# Patient Record
Sex: FEMALE | Race: WHITE | NOT HISPANIC OR LATINO | Employment: FULL TIME | URBAN - METROPOLITAN AREA
[De-identification: names, ages, dates, MRNs, and addresses within clinical notes are randomized per-mention and may not be internally consistent; named-entity substitution may affect disease eponyms.]

---

## 2023-03-23 LAB
ALANINE AMINOTRANSFERASE (SGPT) (U/L) IN SER/PLAS: 65 U/L (ref 7–45)
ALBUMIN (G/DL) IN SER/PLAS: 4.4 G/DL (ref 3.4–5)
ALKALINE PHOSPHATASE (U/L) IN SER/PLAS: 77 U/L (ref 33–110)
ANION GAP IN SER/PLAS: 11 MMOL/L (ref 10–20)
ASPARTATE AMINOTRANSFERASE (SGOT) (U/L) IN SER/PLAS: 32 U/L (ref 9–39)
BASOPHILS (10*3/UL) IN BLOOD BY AUTOMATED COUNT: 0.03 X10E9/L (ref 0–0.1)
BASOPHILS/100 LEUKOCYTES IN BLOOD BY AUTOMATED COUNT: 0.4 % (ref 0–2)
BILIRUBIN TOTAL (MG/DL) IN SER/PLAS: 0.8 MG/DL (ref 0–1.2)
CALCIUM (MG/DL) IN SER/PLAS: 9 MG/DL (ref 8.6–10.3)
CARBON DIOXIDE, TOTAL (MMOL/L) IN SER/PLAS: 25 MMOL/L (ref 21–32)
CHLORIDE (MMOL/L) IN SER/PLAS: 107 MMOL/L (ref 98–107)
CHOLESTEROL (MG/DL) IN SER/PLAS: 234 MG/DL (ref 0–199)
CHOLESTEROL IN HDL (MG/DL) IN SER/PLAS: 89.3 MG/DL
CHOLESTEROL/HDL RATIO: 2.6
CREATININE (MG/DL) IN SER/PLAS: 0.95 MG/DL (ref 0.5–1.05)
EOSINOPHILS (10*3/UL) IN BLOOD BY AUTOMATED COUNT: 0.14 X10E9/L (ref 0–0.7)
EOSINOPHILS/100 LEUKOCYTES IN BLOOD BY AUTOMATED COUNT: 2.1 % (ref 0–6)
ERYTHROCYTE DISTRIBUTION WIDTH (RATIO) BY AUTOMATED COUNT: 12.3 % (ref 11.5–14.5)
ERYTHROCYTE MEAN CORPUSCULAR HEMOGLOBIN CONCENTRATION (G/DL) BY AUTOMATED: 34.1 G/DL (ref 32–36)
ERYTHROCYTE MEAN CORPUSCULAR VOLUME (FL) BY AUTOMATED COUNT: 93 FL (ref 80–100)
ERYTHROCYTES (10*6/UL) IN BLOOD BY AUTOMATED COUNT: 4.82 X10E12/L (ref 4–5.2)
GFR FEMALE: 70 ML/MIN/1.73M2
GLUCOSE (MG/DL) IN SER/PLAS: 85 MG/DL (ref 74–99)
HEMATOCRIT (%) IN BLOOD BY AUTOMATED COUNT: 44.6 % (ref 36–46)
HEMOGLOBIN (G/DL) IN BLOOD: 15.2 G/DL (ref 12–16)
IMMATURE GRANULOCYTES/100 LEUKOCYTES IN BLOOD BY AUTOMATED COUNT: 0.3 % (ref 0–0.9)
LDL: 125 MG/DL (ref 0–99)
LEUKOCYTES (10*3/UL) IN BLOOD BY AUTOMATED COUNT: 6.8 X10E9/L (ref 4.4–11.3)
LYMPHOCYTES (10*3/UL) IN BLOOD BY AUTOMATED COUNT: 1.57 X10E9/L (ref 1.2–4.8)
LYMPHOCYTES/100 LEUKOCYTES IN BLOOD BY AUTOMATED COUNT: 23.2 % (ref 13–44)
MONOCYTES (10*3/UL) IN BLOOD BY AUTOMATED COUNT: 0.69 X10E9/L (ref 0.1–1)
MONOCYTES/100 LEUKOCYTES IN BLOOD BY AUTOMATED COUNT: 10.2 % (ref 2–10)
NEUTROPHILS (10*3/UL) IN BLOOD BY AUTOMATED COUNT: 4.33 X10E9/L (ref 1.2–7.7)
NEUTROPHILS/100 LEUKOCYTES IN BLOOD BY AUTOMATED COUNT: 63.8 % (ref 40–80)
PLATELETS (10*3/UL) IN BLOOD AUTOMATED COUNT: 213 X10E9/L (ref 150–450)
POTASSIUM (MMOL/L) IN SER/PLAS: 4.1 MMOL/L (ref 3.5–5.3)
PROTEIN TOTAL: 6.8 G/DL (ref 6.4–8.2)
SODIUM (MMOL/L) IN SER/PLAS: 139 MMOL/L (ref 136–145)
THYROTROPIN (MIU/L) IN SER/PLAS BY DETECTION LIMIT <= 0.05 MIU/L: 1.96 MIU/L (ref 0.44–3.98)
TRIGLYCERIDE (MG/DL) IN SER/PLAS: 99 MG/DL (ref 0–149)
UREA NITROGEN (MG/DL) IN SER/PLAS: 20 MG/DL (ref 6–23)
VLDL: 20 MG/DL (ref 0–40)

## 2023-03-24 LAB — CALCIDIOL (25 OH VITAMIN D3) (NG/ML) IN SER/PLAS: 45 NG/ML

## 2023-06-20 ENCOUNTER — LAB (OUTPATIENT)
Dept: LAB | Facility: LAB | Age: 58
End: 2023-06-20
Payer: COMMERCIAL

## 2023-06-20 ENCOUNTER — OFFICE VISIT (OUTPATIENT)
Dept: PRIMARY CARE | Facility: CLINIC | Age: 58
End: 2023-06-20
Payer: COMMERCIAL

## 2023-06-20 VITALS
DIASTOLIC BLOOD PRESSURE: 82 MMHG | BODY MASS INDEX: 26.26 KG/M2 | OXYGEN SATURATION: 97 % | SYSTOLIC BLOOD PRESSURE: 124 MMHG | WEIGHT: 153 LBS | HEART RATE: 85 BPM

## 2023-06-20 DIAGNOSIS — H93.12 TINNITUS OF LEFT EAR: Primary | ICD-10-CM

## 2023-06-20 DIAGNOSIS — E03.8 OTHER SPECIFIED HYPOTHYROIDISM: ICD-10-CM

## 2023-06-20 LAB
THYROTROPIN (MIU/L) IN SER/PLAS BY DETECTION LIMIT <= 0.05 MIU/L: 0.69 MIU/L (ref 0.44–3.98)
THYROXINE (T4) FREE (NG/DL) IN SER/PLAS: 0.99 NG/DL (ref 0.61–1.12)

## 2023-06-20 PROCEDURE — 86800 THYROGLOBULIN ANTIBODY: CPT

## 2023-06-20 PROCEDURE — 36415 COLL VENOUS BLD VENIPUNCTURE: CPT

## 2023-06-20 PROCEDURE — 99214 OFFICE O/P EST MOD 30 MIN: CPT | Performed by: FAMILY MEDICINE

## 2023-06-20 PROCEDURE — 86376 MICROSOMAL ANTIBODY EACH: CPT

## 2023-06-20 PROCEDURE — 84481 FREE ASSAY (FT-3): CPT

## 2023-06-20 PROCEDURE — 1036F TOBACCO NON-USER: CPT | Performed by: FAMILY MEDICINE

## 2023-06-20 PROCEDURE — 84439 ASSAY OF FREE THYROXINE: CPT

## 2023-06-20 PROCEDURE — 84443 ASSAY THYROID STIM HORMONE: CPT

## 2023-06-20 RX ORDER — ESTRADIOL AND NORETHINDRONE ACETATE 1; .5 MG/1; MG/1
1 TABLET ORAL DAILY
COMMUNITY

## 2023-06-20 RX ORDER — FAMOTIDINE 20 MG/1
20 TABLET, FILM COATED ORAL DAILY
COMMUNITY

## 2023-06-20 RX ORDER — MECLIZINE HYDROCHLORIDE 25 MG/1
TABLET ORAL
COMMUNITY
Start: 2023-01-27

## 2023-06-20 RX ORDER — FLUTICASONE PROPIONATE 50 MCG
2 SPRAY, SUSPENSION (ML) NASAL
COMMUNITY
Start: 2017-05-05

## 2023-06-20 RX ORDER — MINERAL OIL
180 ENEMA (ML) RECTAL
COMMUNITY

## 2023-06-20 ASSESSMENT — PATIENT HEALTH QUESTIONNAIRE - PHQ9
SUM OF ALL RESPONSES TO PHQ9 QUESTIONS 1 AND 2: 0
1. LITTLE INTEREST OR PLEASURE IN DOING THINGS: NOT AT ALL
2. FEELING DOWN, DEPRESSED OR HOPELESS: NOT AT ALL

## 2023-06-20 NOTE — PROGRESS NOTES
Subjective   Kayla Vanegas is a 58 y.o. female who presents for Follow-up (Follow up on Thyroid).  6 month follow up     HPI  Kayla is a pleasant 57 yr old female here for CPE and follow up   Allergic to penicillins   is a   BP is high today and weight is stable 147/84    #) Elevated HR   - COVID in Aug 2022  - up to 150+ per her smart watch    #) IBS, constipation   - seeing BOUCHRA with Dr Whitney office   - start in the linzess 290 about 1 month ago    leakage of stool, - did see Dr Tiwari and was not given answers   - no belly pain   - is having gas and bloating,  - sensation on feeling food move through the esophagus   - tried PPI and didn’t seem to make much or a difference.   - no colonoscopy, did have a normal Cologuard     #) dyspnea with occasional heart palpitations- about the same  -= CT of the chest looks stable   - h/o seasonal allergies, GERD and Sarcoidosis on CT of the lungs in the past.   - will get PFTS and trial of albuterol   - with some elevated BP and heartburn  - EKG done last visit   - started a couple of months ago  - winded after taking a shower and changing laundry  - CAD on both sides  - no h/o clots personal or family,   - left leg cramps are chronic, no calf swelling or pain   - no cough  - chest heaviness   - gained 10# in 6 months with COVID  - 2013 EKG was NSR   - no headaches.   - not currently on treatment for GERD or Anxiety.     #) Heart disease runs in family   27 brother had MI   will get CT coronary calcium test and stress     #) stress with anxiety- stable   - poor sleep   - aging mother now in a nursing home   - is receptive to counseling but doesn’t have the time  - pt has a stressful job working for [employer]   - denies need for medications and/or counseling     #) low Vit D   - 31 on 5/16/19  - taking OTC     #) Elevated BUN, recheck in 2 months  - wnl on 11/20/19    #) elevated TSH, recheck in 2 months - still under producing thyroid, will rec starting 50 mcg  "daily     #) menopause- reports doing great on the Vitex , recently stopped it   - reports some dysfunctional sleep  - weight is stable   - mood is slightly better    #) left ear tinnitus - stable - RESOLVED and then came back - better with abx at  3 weeks ago   - \"wooshing sound\"  No ear pain or drainage.   no associated dizziness or palpitations  no vision changes   never had a formal hearing assessment.     #) seasonal and environmental allergies- stable   - on flonase prn  - occasional claritin    #) H/o kidney stones - better   - plans to see nephro tomorrow   - no pain   - no hematuria or dysuria   - no incontinence     #) GERD - stable , PPI didn’t seen to help much   - no on anything for this, and eats spicy foods   - occasional  - associated with spicy foods  - not taking anything for it   - never had an EGD    #) H/o ITP- stable   - 23 yrs ago after sons birth  - treated with steroids and IVIG     #) dx of sarcoidosis years ago  - granuloma found on ct scan  - no unexplained weight loss or shortness of breath    #) Preventive:  Smoker: never   Etoh: social/occasional   BMI: 22.7  Fam h/o: breast CA, T2DM and CAD  Vaccinations: never gets flu shots,   PAP: 2017  Mammogram: 3/2017; q 6 month mammograms for left breast lesion (through [facility])   Colonoscopy: needs, deferring until later   DEXA: n/a  Fasting blood work: 5/16/19  Last eye exam: UTD   Last dental Exam: needs  Willi H/o appy, C section and tubal ligation     ROS was completed and all systems are negative with the exception of what was noted in the the HPI.     Objective     /82   Pulse 85   Wt 69.4 kg (153 lb)   SpO2 97%   BMI 26.26 kg/m²      Physical Exam  GEN: A+O, no acute distress  HEENT: NC/AT, Oropharynx clear, no exudates, TM visualized, Extraoccular muscles intact, no facial droop; no thyromegaly or cervical LAD  RESP: CTAB, no wheezes   CV: RRR, no murmurs  ABD: soft, non-tender, + BS  SKIN: no rashes or bruising, no " "peripheral edema   NEURO: CN II-XII grossly intact, moves all extremities equally, no tremor   PSYCH: normal affect, appropriate mood     Assessment/Plan   Problem List Items Addressed This Visit    None    Lets recheck the thyroid for the difficulty losing weight, fatigue and hair loss,   - will add T3, T4, and antibody testing to the TSH levels.     Continue the Linzess 290 daily and the Metamucil for the irregular stool.   You should also get an upper scope at the same time.   Try to get at least 35 grams of fiber per day.   Avoid carbonation and artificial sweeteners     Please schedule a hearing test for the \"wooshing\" in the ear.     Try encapsulated peppermint might help bloating and gas.    We should consider a home sleep study for the breathing, palpitations and insomnia.     Your blood pressure is looking so much better and is in range at 124/82.     Continue the flonase daily, allegra and muscinex     Follow up with Dr Manriquez for women examination.     Please continue the vitamin D3 4000 IU daily, it was 45 in March 2023    Follow up in 3 months or sooner as needed     Follow up in January 2024 for your yearly physical        Haylee Steiner, DO, MSMed, ABOM  [address]   [address]   [place] [address]  Ph. [phone]  Fx. [phone]  "

## 2023-06-20 NOTE — PATIENT INSTRUCTIONS
"Lets recheck the thyroid for the difficulty losing weight, fatigue and hair loss,   - will add T3, T4, and antibody testing to the TSH levels.     Continue the Linzess 290 daily and the Metamucil for the irregular stool.   You should also get an upper scope at the same time.   Try to get at least 35 grams of fiber per day.   Avoid carbonation and artificial sweeteners     Please schedule a hearing test for the \"wooshing\" in the ear.     Try encapsulated peppermint might help bloating and gas.    We should consider a home sleep study for the breathing, palpitations and insomnia.     Your blood pressure is looking so much better and is in range at 124/82.     Continue the flonase daily, allegra and muscinex     Follow up with Dr Manriquez for women examination.     Please continue the vitamin D3 4000 IU daily, it was 45 in March 2023    Follow up in 3 months or sooner as needed     Follow up in January 2024 for your yearly physical   "

## 2023-06-21 LAB
THYROPEROXIDASE AB (IU/ML) IN SER/PLAS: 448 IU/ML
TRIIODOTHYRONINE (T3) FREE (PG/ML) IN SER/PLAS: 2.9 PG/ML (ref 2.3–4.2)

## 2023-06-23 LAB — THYROGLOBULIN AB (IU/ML) IN SER/PLAS: 2.1 IU/ML (ref 0–4)

## 2023-09-18 ENCOUNTER — OFFICE VISIT (OUTPATIENT)
Dept: PRIMARY CARE | Facility: CLINIC | Age: 58
End: 2023-09-18
Payer: COMMERCIAL

## 2023-09-18 ENCOUNTER — TELEPHONE (OUTPATIENT)
Dept: PRIMARY CARE | Facility: CLINIC | Age: 58
End: 2023-09-18

## 2023-09-18 VITALS
BODY MASS INDEX: 26.11 KG/M2 | HEART RATE: 91 BPM | SYSTOLIC BLOOD PRESSURE: 124 MMHG | WEIGHT: 152 LBS | OXYGEN SATURATION: 96 % | DIASTOLIC BLOOD PRESSURE: 74 MMHG

## 2023-09-18 DIAGNOSIS — K59.00 CONSTIPATION, UNSPECIFIED CONSTIPATION TYPE: ICD-10-CM

## 2023-09-18 DIAGNOSIS — E03.8 OTHER SPECIFIED HYPOTHYROIDISM: ICD-10-CM

## 2023-09-18 DIAGNOSIS — E06.3 HASHIMOTO'S DISEASE: Primary | ICD-10-CM

## 2023-09-18 DIAGNOSIS — E06.3 HASHIMOTO'S DISEASE: ICD-10-CM

## 2023-09-18 PROCEDURE — 99214 OFFICE O/P EST MOD 30 MIN: CPT | Performed by: FAMILY MEDICINE

## 2023-09-18 PROCEDURE — 90686 IIV4 VACC NO PRSV 0.5 ML IM: CPT | Performed by: FAMILY MEDICINE

## 2023-09-18 PROCEDURE — 1036F TOBACCO NON-USER: CPT | Performed by: FAMILY MEDICINE

## 2023-09-18 PROCEDURE — 90471 IMMUNIZATION ADMIN: CPT | Performed by: FAMILY MEDICINE

## 2023-09-18 RX ORDER — THYROID 30 MG/1
30 TABLET ORAL
Qty: 90 TABLET | Refills: 3 | Status: SHIPPED | OUTPATIENT
Start: 2023-09-18 | End: 2024-09-17

## 2023-09-18 RX ORDER — THYROID 30 MG/1
30 TABLET ORAL
Qty: 90 TABLET | Refills: 3 | Status: SHIPPED | OUTPATIENT
Start: 2023-09-18 | End: 2023-09-18 | Stop reason: SDUPTHER

## 2023-09-18 RX ORDER — LIOTHYRONINE SODIUM 5 UG/1
5 TABLET ORAL DAILY
Qty: 90 TABLET | Refills: 3 | Status: SHIPPED | OUTPATIENT
Start: 2023-09-18 | End: 2024-09-17

## 2023-09-18 ASSESSMENT — PATIENT HEALTH QUESTIONNAIRE - PHQ9
2. FEELING DOWN, DEPRESSED OR HOPELESS: NOT AT ALL
SUM OF ALL RESPONSES TO PHQ9 QUESTIONS 1 AND 2: 0
1. LITTLE INTEREST OR PLEASURE IN DOING THINGS: NOT AT ALL

## 2023-09-18 NOTE — PROGRESS NOTES
Subjective   Patient is a 58 y.o. female presents for yearly physical examination.   Allergic to penicillins   is a     #) Elevated HR - still fluctuating   - no associated symptoms  - BP is good now at 124/74   - COVID in Aug 2022  - up to 150+ per her smart watch    #) IBS, constipation   - seeing BOUCHRA with Dr Whitney office   - start in the linzess 290 about 4 months ago   - has been taking stool softener with the linzess    leakage of stool, - did see Dr Tiwari and was not given answers   - no belly pain   - is having gas and bloating,  - sensation on feeling food move through the esophagus   - tried PPI and didn’t seem to make much or a difference.   - no colonoscopy, did have a normal Cologuard     #) dyspnea with occasional heart palpitations- about the same  -= CT of the chest looks stable   - h/o seasonal allergies, GERD and Sarcoidosis on CT of the lungs in the past.   - will get PFTS and trial of albuterol   - with some elevated BP and heartburn  - EKG done last visit   - started a couple of months ago  - winded after taking a shower and changing laundry  - CAD on both sides  - no h/o clots personal or family,   - left leg cramps are chronic, no calf swelling or pain   - no cough  - chest heaviness   - gained 10# in 6 months with COVID  - 2013 EKG was NSR   - no headaches.   - not currently on treatment for GERD or Anxiety.     #) Heart disease runs in family   27 brother had MI   will get CT coronary calcium test and stress     #) stress with anxiety- stable   - poor sleep   - aging mother now in a nursing home   - is receptive to counseling but doesn’t have the time  - pt has a stressful job working for [employer]   - denies need for medications and/or counseling     #) low Vit D   - 31 on 5/16/19  - taking OTC     #) Elevated BUN, recheck in 2 months  - wnl on 11/20/19    #) elevated TSH, recheck in 2 months - still under producing thyroid, will rec starting 50 mcg daily     #) menopause-  "reports doing great on the Vitex , recently stopped it   - reports some dysfunctional sleep  - weight is stable   - mood is slightly better    #) left ear tinnitus - stable - RESOLVED and then came back - better with abx at  3 weeks ago   - \"wooshing sound\"  No ear pain or drainage.   no associated dizziness or palpitations  no vision changes   never had a formal hearing assessment.     #) seasonal and environmental allergies- stable   - on flonase prn  - occasional claritin    #) H/o kidney stones - better   - plans to see nephro tomorrow   - no pain   - no hematuria or dysuria   - no incontinence     #) GERD - stable , PPI didn’t seen to help much   - no on anything for this, and eats spicy foods   - occasional  - associated with spicy foods  - not taking anything for it   - never had an EGD    #) H/o ITP- stable   - 23 yrs ago after sons birth  - treated with steroids and IVIG     #) dx of sarcoidosis years ago  - granuloma found on ct scan  - no unexplained weight loss or shortness of breath    #) Preventive:  Smoker: never   Etoh: social/occasional   BMI: 22.7  Fam h/o: breast CA, T2DM and CAD  Vaccinations: never gets flu shots,   PAP: 2017  Mammogram: 3/2017; q 6 month mammograms for left breast lesion (through [facility])   Colonoscopy: needs, deferring until later   DEXA: n/a  Fasting blood work: 5/16/19  Last eye exam: UTD   Last dental Exam: needs  Willi H/o appy, C section and tubal ligation     Review of system are negative unless otherwise stated in the HPI.     Objective     /74   Pulse 91   Wt 68.9 kg (152 lb)   SpO2 96%   BMI 26.11 kg/m²     Physical Examination  GEN: A+O, no acute distress  HEENT: NC/AT, Oropharynx clear, no exudates, TM visualized, Extraoccular muscles intact, no facial droop; no thyromegaly or cervical LAD  RESP: CTAB, no wheezes   CV: RRR, no murmurs  ABD: soft, non-tender, + BS  SKIN: no rashes or bruising, no peripheral edema   NEURO: CN II-XII grossly intact, moves " "all extremities equally, no tremor   PSYCH: normal affect, appropriate mood     Assessment/Plan     Labs showed Your thyroid levels are all normal , you do however have elevated Antithyroid Peroxidase Antibody levels. These antibodies can be a sign of Hashimoto disease, also known as Hashimoto thyroiditis. This is an autoimmune disease and the most common cause of hypothyroidism.     Stop the levothyroxine,   START the [place] thyroid 30 mg and the Cytomel 5 mg in the AM on an empty stomach     Continue the Linzess 290 daily and the miralax   get an upper scope at the same time- ordered and is scheduled for November 2023   Was treated with rifaximin and helped for a little while and then returned 2-3 later.     Please follow up with GI.  Herbals to try include: Candibactin AR and Candibactin BR three times per day for 2 months. And Atrantil for bloating.   As bacteria is dying you might need a binder such as activated charcoal or GI DeTOX +- Zeolite, Charcoal and Herbal formula ....30 min prior to or 90 mins after other medications/supplements/food.     Also try the Low FODMAP diet.     Please schedule a hearing test for the \"wooshing\" in the ear.     We should consider a home sleep study for the breathing, palpitations and insomnia.     Your blood pressure is looking so much better and is in range at 124/82.     Continue the flonase daily, allegra and muscinex     Follow up with Dr Manriquez for women examination.     Please continue the vitamin D3 4000 IU daily, it was 45 in March 2023    Follow up in 1 month virtual/ phone     Follow up in January 2024 for your yearly physical   "

## 2023-09-18 NOTE — PATIENT INSTRUCTIONS
"Labs showed Your thyroid levels are all normal , you do however have elevated Antithyroid Peroxidase Antibody levels. These antibodies can be a sign of Hashimoto disease, also known as Hashimoto thyroiditis. This is an autoimmune disease and the most common cause of hypothyroidism.     Stop the levothyroxine,   START the [place] thyroid 30 mg and the Cytomel 5 mg in the AM on an empty stomach     Continue the Linzess 290 daily and the miralax   get an upper scope at the same time- ordered and is scheduled for November 2023   Was treated with rifaximin and helped for a little while and then returned 2-3 later.     Please follow up with GI.  Herbals to try include: Candibactin AR and Candibactin BR three times per day for 2 months. And Atrantil for bloating.   As bacteria is dying you might need a binder such as activated charcoal or GI DeTOX +- Zeolite, Charcoal and Herbal formula ....30 min prior to or 90 mins after other medications/supplements/food.     Also try the Low FODMAP diet.     Please schedule a hearing test for the \"wooshing\" in the ear.     We should consider a home sleep study for the breathing, palpitations and insomnia.     Your blood pressure is looking so much better and is in range at 124/82.     Continue the flonase daily, allegra and muscinex     Follow up with Dr Manriquez for women examination.     Please continue the vitamin D3 4000 IU daily, it was 45 in March 2023    Follow up in 1 month virtual/ phone     Follow up in January 2024 for your yearly physical   "

## 2023-10-26 ENCOUNTER — APPOINTMENT (OUTPATIENT)
Dept: PRIMARY CARE | Facility: CLINIC | Age: 58
End: 2023-10-26
Payer: COMMERCIAL

## 2023-10-31 ENCOUNTER — TELEMEDICINE (OUTPATIENT)
Dept: PRIMARY CARE | Facility: CLINIC | Age: 58
End: 2023-10-31
Payer: COMMERCIAL

## 2023-10-31 DIAGNOSIS — E06.3 HASHIMOTO'S DISEASE: Primary | ICD-10-CM

## 2023-10-31 PROCEDURE — 99213 OFFICE O/P EST LOW 20 MIN: CPT | Performed by: FAMILY MEDICINE

## 2023-10-31 NOTE — PATIENT INSTRUCTIONS
"Labs showed Your thyroid levels are all normal , you do however have elevated Antithyroid Peroxidase Antibody levels. These antibodies can be a sign of Hashimoto disease, also known as Hashimoto thyroiditis. This is an autoimmune disease and the most common cause of hypothyroidism.     Continue the [place] thyroid 30 mg and the Cytomel 5 mg in the AM on an empty stomach   Recheck the thyroid levels in 1 month.     Continue to get the schedule Upper and lower scope.     Please continue the Candibactin AR and Candibactin BR three times per day for 2 months.     Please schedule a hearing test for the \"wooshing\" in the ear.   We should consider a home sleep study for the breathing, palpitations and insomnia.     Your blood pressure is looking so much better and is in range at 124/82.     Continue the flonase daily, allegra and muscinex     Follow up with Dr Manriquez for women yearly as recommend,     Please get the mammogram     Please continue the vitamin D3 4000 IU daily, it was 45 in March 2023    Follow up in January 2024 for your yearly physical   "

## 2023-10-31 NOTE — PROGRESS NOTES
Subjective   Patient is a 58 y.o. female presents for yearly physical examination.   Allergic to penicillins   is a     #) Elevated HR - still fluctuating   - no associated symptoms  - BP is good now at 124/74   - COVID in Aug 2022  - up to 150+ per her smart watch    #) IBS, constipation   - seeing BOUCHRA with Dr Whitney office   - start in the linzess 290 about 4 months ago   - has been taking stool softener with the linzess    leakage of stool, - did see Dr Tiwari and was not given answers   - no belly pain   - is having gas and bloating,  - sensation on feeling food move through the esophagus   - tried PPI and didn’t seem to make much or a difference.   - no colonoscopy, did have a normal Cologuard     #) dyspnea with occasional heart palpitations- about the same  -= CT of the chest looks stable   - h/o seasonal allergies, GERD and Sarcoidosis on CT of the lungs in the past.   - will get PFTS and trial of albuterol   - with some elevated BP and heartburn  - EKG done last visit   - started a couple of months ago  - winded after taking a shower and changing laundry  - CAD on both sides  - no h/o clots personal or family,   - left leg cramps are chronic, no calf swelling or pain   - no cough  - chest heaviness   - gained 10# in 6 months with COVID  - 2013 EKG was NSR   - no headaches.   - not currently on treatment for GERD or Anxiety.     #) Heart disease runs in family   27 brother had MI   will get CT coronary calcium test and stress     #) stress with anxiety- stable   - poor sleep   - aging mother now in a nursing home   - is receptive to counseling but doesn’t have the time  - pt has a stressful job working for [employer]   - denies need for medications and/or counseling     #) low Vit D   - 31 on 5/16/19  - taking OTC     #) Elevated BUN, recheck in 2 months  - wnl on 11/20/19    #) elevated TSH, recheck in 2 months - still under producing thyroid, will rec starting 50 mcg daily     #) menopause-  "reports doing great on the Vitex , recently stopped it   - reports some dysfunctional sleep  - weight is stable   - mood is slightly better    #) left ear tinnitus - stable - RESOLVED and then came back - better with abx at  3 weeks ago   - \"wooshing sound\"  No ear pain or drainage.   no associated dizziness or palpitations  no vision changes   never had a formal hearing assessment.     #) seasonal and environmental allergies- stable   - on flonase prn  - occasional claritin    #) H/o kidney stones - better   - plans to see nephro tomorrow   - no pain   - no hematuria or dysuria   - no incontinence     #) GERD - stable , PPI didn’t seen to help much   - no on anything for this, and eats spicy foods   - occasional  - associated with spicy foods  - not taking anything for it   - never had an EGD    #) H/o ITP- stable   - 23 yrs ago after sons birth  - treated with steroids and IVIG     #) dx of sarcoidosis years ago  - granuloma found on ct scan  - no unexplained weight loss or shortness of breath    #) Preventive:  Smoker: never   Etoh: social/occasional   BMI: 22.7  Fam h/o: breast CA, T2DM and CAD  Vaccinations: never gets flu shots,   PAP: 2017  Mammogram: 3/2017; q 6 month mammograms for left breast lesion (through [facility])   Colonoscopy: needs, deferring until later   DEXA: n/a  Fasting blood work: 5/16/19  Last eye exam: UTD   Last dental Exam: needs  Willi H/o appy, C section and tubal ligation     Review of system are negative unless otherwise stated in the HPI.     Objective     /74   Pulse 91   Wt 68.9 kg (152 lb)   SpO2 96%   BMI 26.11 kg/m²     Physical Examination  Deferred due to virtual    Assessment/Plan     Labs showed Your thyroid levels are all normal , you do however have elevated Antithyroid Peroxidase Antibody levels. These antibodies can be a sign of Hashimoto disease, also known as Hashimoto thyroiditis. This is an autoimmune disease and the most common cause of hypothyroidism. " "    Continue the [place] thyroid 30 mg and the Cytomel 5 mg in the AM on an empty stomach   Recheck the thyroid levels in 1 month.     Continue to get the schedule Upper and lower scope.     Please continue the Candibactin AR and Candibactin BR three times per day for 2 months.     Please schedule a hearing test for the \"wooshing\" in the ear.   We should consider a home sleep study for the breathing, palpitations and insomnia.     Your blood pressure is looking so much better and is in range at 124/82.     Continue the flonase daily, allegra and muscinex     Follow up with Dr Manriquez for women yearly as recommend,     Please get the mammogram     Please continue the vitamin D3 4000 IU daily, it was 45 in March 2023    Follow up in January 2024 for your yearly physical   "

## 2023-11-28 ENCOUNTER — APPOINTMENT (OUTPATIENT)
Dept: RADIOLOGY | Facility: CLINIC | Age: 58
End: 2023-11-28
Payer: COMMERCIAL

## 2023-12-05 ENCOUNTER — ANCILLARY PROCEDURE (OUTPATIENT)
Dept: RADIOLOGY | Facility: CLINIC | Age: 58
End: 2023-12-05
Payer: COMMERCIAL

## 2023-12-05 VITALS — HEIGHT: 63 IN | WEIGHT: 146 LBS | BODY MASS INDEX: 25.87 KG/M2

## 2023-12-05 DIAGNOSIS — Z12.31 ENCOUNTER FOR SCREENING MAMMOGRAM FOR MALIGNANT NEOPLASM OF BREAST: ICD-10-CM

## 2023-12-05 PROCEDURE — 77063 BREAST TOMOSYNTHESIS BI: CPT

## 2023-12-21 ENCOUNTER — TELEPHONE (OUTPATIENT)
Dept: PRIMARY CARE | Facility: CLINIC | Age: 58
End: 2023-12-21
Payer: COMMERCIAL

## 2023-12-22 DIAGNOSIS — I10 PRIMARY HYPERTENSION: Primary | ICD-10-CM

## 2023-12-22 RX ORDER — LOSARTAN POTASSIUM 25 MG/1
25 TABLET ORAL DAILY
Qty: 90 TABLET | Refills: 3 | Status: SHIPPED | OUTPATIENT
Start: 2023-12-22 | End: 2024-12-21

## 2023-12-22 NOTE — TELEPHONE ENCOUNTER
Has been taking bp at home  Tuesday    Am-155/104  Pm-161/100  Wednesday   Am-160/111  Pm-157/99  Thursday   Am-153/101  Pm-152/109    Has had a couple times were she has been sob and light headed

## 2024-01-26 ENCOUNTER — LAB (OUTPATIENT)
Dept: LAB | Facility: LAB | Age: 59
End: 2024-01-26
Payer: COMMERCIAL

## 2024-01-26 DIAGNOSIS — E06.3 HASHIMOTO'S DISEASE: ICD-10-CM

## 2024-01-26 LAB
T4 FREE SERPL-MCNC: 0.54 NG/DL (ref 0.61–1.12)
TSH SERPL-ACNC: 2.05 MIU/L (ref 0.44–3.98)

## 2024-01-26 PROCEDURE — 84481 FREE ASSAY (FT-3): CPT

## 2024-01-26 PROCEDURE — 84443 ASSAY THYROID STIM HORMONE: CPT

## 2024-01-26 PROCEDURE — 36415 COLL VENOUS BLD VENIPUNCTURE: CPT

## 2024-01-26 PROCEDURE — 84439 ASSAY OF FREE THYROXINE: CPT

## 2024-01-27 LAB — T3FREE SERPL-MCNC: 3.5 PG/ML (ref 2.3–4.2)

## 2024-08-23 ENCOUNTER — APPOINTMENT (OUTPATIENT)
Dept: PRIMARY CARE | Facility: CLINIC | Age: 59
End: 2024-08-23
Payer: COMMERCIAL

## 2024-08-23 VITALS
WEIGHT: 150 LBS | BODY MASS INDEX: 26.57 KG/M2 | SYSTOLIC BLOOD PRESSURE: 124 MMHG | HEART RATE: 88 BPM | DIASTOLIC BLOOD PRESSURE: 76 MMHG | OXYGEN SATURATION: 97 %

## 2024-08-23 DIAGNOSIS — Z82.49 FAMILY HISTORY OF CORONARY ARTERY DISEASE: ICD-10-CM

## 2024-08-23 DIAGNOSIS — R00.2 PALPITATIONS: Primary | ICD-10-CM

## 2024-08-23 DIAGNOSIS — E06.3 HASHIMOTO'S DISEASE: ICD-10-CM

## 2024-08-23 DIAGNOSIS — Z12.31 ENCOUNTER FOR SCREENING MAMMOGRAM FOR MALIGNANT NEOPLASM OF BREAST: ICD-10-CM

## 2024-08-23 DIAGNOSIS — Z00.00 ROUTINE GENERAL MEDICAL EXAMINATION AT A HEALTH CARE FACILITY: ICD-10-CM

## 2024-08-23 DIAGNOSIS — E55.9 AVITAMINOSIS D: ICD-10-CM

## 2024-08-23 DIAGNOSIS — Z13.220 LIPID SCREENING: ICD-10-CM

## 2024-08-23 PROCEDURE — 99214 OFFICE O/P EST MOD 30 MIN: CPT | Performed by: FAMILY MEDICINE

## 2024-08-23 PROCEDURE — 1036F TOBACCO NON-USER: CPT | Performed by: FAMILY MEDICINE

## 2024-08-23 PROCEDURE — 99396 PREV VISIT EST AGE 40-64: CPT | Performed by: FAMILY MEDICINE

## 2024-08-23 ASSESSMENT — PATIENT HEALTH QUESTIONNAIRE - PHQ9
SUM OF ALL RESPONSES TO PHQ9 QUESTIONS 1 AND 2: 0
2. FEELING DOWN, DEPRESSED OR HOPELESS: NOT AT ALL
1. LITTLE INTEREST OR PLEASURE IN DOING THINGS: NOT AT ALL

## 2024-08-23 NOTE — PATIENT INSTRUCTIONS
"Please get the heart monitor on for 2 weeks.     Try a nightly magnesium glycinate 400 mg for the palpitation and leg cramp    Continue the [place] thyroid 30 mg and the Cytomel 5 mg in the AM on an empty stomach   Recheck the thyroid levels now.     Continue follow up with Dr Whitney for the colonic polyp     Glad the constipation seems better     Please schedule a hearing test for the \"wooshing\" in the ear.   We should consider a home sleep study for the breathing, palpitations and insomnia.     Your blood pressure is looking so much better and is in range at 124/76    Continue the flonase daily, allegra and muscinex     Follow up with Dr Manriquez for women yearly as recommend,     Please get the mammogram in December 2024     Please continue the vitamin D3 4000 IU daily, it was 45 in March 2023    Follow up in 6 months for follow up   "

## 2024-08-23 NOTE — PROGRESS NOTES
Subjective   Patient is a 59 y.o. female presents for yearly physical examination.   Son's wife is being induced for 1st grandbaby     Allergic to penicillins   is a     #) Elevated HR - still fluctuating  - some over 200  - a few times per month  - triggered with physical activity  - no associated symptoms  - BP is good now at 124/74   - was on HRT but stopped it last year  - COVID in Aug 2022  - up to 150+ per her smart watch    #) IBS, constipation - doing better   - tubulovillous polyp, had to get removed by  colorectal surgery  - last colonoscopy was 7/26/24 - with 1 year follow up     #) Night time waking 1-2 times  - unsure if snores, never told she snores  - tired in the afternoons  - never had a sleep study     #) Dyspnea with occasional heart palpitations- about the same  -= CT of the chest looks stable   - h/o seasonal allergies, GERD and Sarcoidosis on CT of the lungs in the past.   - will get PFTS and trial of albuterol   - with some elevated BP and heartburn  - EKG done last visit   - started a couple of months ago  - winded after taking a shower and changing laundry  - CAD on both sides  - no h/o clots personal or family,   - left leg cramps are chronic, no calf swelling or pain   - no cough  - chest heaviness   - gained 10# in 6 months with COVID  - 2013 EKG was NSR   - no headaches.   - not currently on treatment for GERD or Anxiety.     #) Heart disease runs in family   27 brother had MI   will get CT coronary calcium test and stress     #) stress with anxiety- stable   - poor sleep   - aging mother now in a nursing home   - is receptive to counseling but doesn’t have the time  - pt has a stressful job working for [employer]   - denies need for medications and/or counseling     #) low Vit D   - 31 on 5/16/19  - taking OTC     #) Elevated BUN, recheck in 2 months  - wnl on 11/20/19    #) elevated TSH, recheck in 2 months - still under producing thyroid, will rec starting 50 mcg daily  "    #) menopause- reports doing great on the [employer]ite[employer] , recently stopped it   - reports some dysfunctional sleep  - weight is stable   - mood is slightly better    #) left ear tinnitus - stable - RESOLVED and then came back - better with abx at  3 weeks ago   - \"wooshing sound\"  No ear pain or drainage.   no associated dizziness or palpitations  no vision changes   never had a formal hearing assessment.     #) seasonal and environmental allergies- stable   - on flonase prn  - occasional claritin    #) H/o kidney stones - better   - plans to see nephro tomorrow   - no pain   - no hematuria or dysuria   - no incontinence     #) GERD - stable , PPI didn’t seen to help much   - no on anything for this, and eats spicy foods   - occasional  - associated with spicy foods  - not taking anything for it   - never had an EGD    #) H/o ITP- stable   - 23 yrs ago after sons birth  - treated with steroids and IVIG     #) dx of sarcoidosis years ago  - granuloma found on ct scan  - no unexplained weight loss or shortness of breath    #) Preventive:  Smoker: never   Etoh: social/occasional   BMI: 22.7  Fam h/o: breast CA, T2DM and CAD  Vaccinations: never gets flu shots,   PAP: 2017  Mammogram: 3/2017; q 6 month mammograms for left breast lesion (through [facility])   Colonoscopy: needs, deferring until later   DEXA: n/a  Fasting blood work: 5/16/19  Last eye exam: UTD   Last dental Exam: needs  Willi H/o appy, C section and tubal ligation     Review of system are negative unless otherwise stated in the HPI.     Objective     /76   Pulse 88   Wt 68 kg (150 lb)   SpO2 97%   BMI 26.57 kg/m²     Physical Examination  GEN: A+O, no acute distress  HEENT: NC/AT, Oropharynx clear, no exudates, TM visualized, Extraoccular muscles intact, no facial droop; no thyromegaly or cervical LAD  RESP: CTAB, no wheezes   CV: RRR, no murmurs  ABD: soft, non-tender, + BS  SKIN: no rashes or bruising, no peripheral edema   NEURO: CN II-XII " "grossly intact, moves all extremities equally, no tremor   PSYCH: normal affect, appropriate mood     Assessment/Plan   Please get the heart monitor on for 2 weeks.     Try a nightly magnesium glycinate 400 mg for the palpitation and leg cramp    Continue the [place] thyroid 30 mg and the Cytomel 5 mg in the AM on an empty stomach   Recheck the thyroid levels now.     Continue follow up with Dr Whitney for the colonic polyp     Glad the constipation seems better     Please schedule a hearing test for the \"wooshing\" in the ear.   We should consider a home sleep study for the breathing, palpitations and insomnia.     Your blood pressure is looking so much better and is in range at 124/76    Continue the flonase daily, allegra and muscinex     Follow up with Dr Manriquez for women yearly as recommend,     Please get the mammogram in December 2024     Please continue the vitamin D3 4000 IU daily, it was 45 in March 2023    Follow up in 6 months for follow up   "

## 2024-08-26 DIAGNOSIS — E03.8 OTHER SPECIFIED HYPOTHYROIDISM: ICD-10-CM

## 2024-08-26 DIAGNOSIS — E06.3 HASHIMOTO'S DISEASE: ICD-10-CM

## 2024-08-26 RX ORDER — LIOTHYRONINE SODIUM 5 UG/1
5 TABLET ORAL DAILY
Qty: 90 TABLET | Refills: 3 | Status: SHIPPED | OUTPATIENT
Start: 2024-08-26

## 2024-09-16 ENCOUNTER — LAB (OUTPATIENT)
Dept: LAB | Facility: LAB | Age: 59
End: 2024-09-16
Payer: COMMERCIAL

## 2024-09-16 ENCOUNTER — HOSPITAL ENCOUNTER (OUTPATIENT)
Dept: RADIOLOGY | Facility: HOSPITAL | Age: 59
Discharge: HOME | End: 2024-09-16
Payer: COMMERCIAL

## 2024-09-16 DIAGNOSIS — E55.9 AVITAMINOSIS D: ICD-10-CM

## 2024-09-16 DIAGNOSIS — E06.3 HASHIMOTO'S DISEASE: ICD-10-CM

## 2024-09-16 DIAGNOSIS — Z00.00 ROUTINE GENERAL MEDICAL EXAMINATION AT A HEALTH CARE FACILITY: ICD-10-CM

## 2024-09-16 DIAGNOSIS — Z82.49 FAMILY HISTORY OF CORONARY ARTERY DISEASE: ICD-10-CM

## 2024-09-16 LAB
ALBUMIN SERPL BCP-MCNC: 4.2 G/DL (ref 3.4–5)
ALP SERPL-CCNC: 74 U/L (ref 33–110)
ALT SERPL W P-5'-P-CCNC: 37 U/L (ref 7–45)
ANION GAP SERPL CALC-SCNC: 9 MMOL/L (ref 10–20)
AST SERPL W P-5'-P-CCNC: 26 U/L (ref 9–39)
BASOPHILS # BLD AUTO: 0.04 X10*3/UL (ref 0–0.1)
BASOPHILS NFR BLD AUTO: 0.7 %
BILIRUB SERPL-MCNC: 0.5 MG/DL (ref 0–1.2)
BUN SERPL-MCNC: 22 MG/DL (ref 6–23)
CALCIUM SERPL-MCNC: 9.4 MG/DL (ref 8.6–10.3)
CHLORIDE SERPL-SCNC: 108 MMOL/L (ref 98–107)
CHOLEST SERPL-MCNC: 240 MG/DL (ref 0–199)
CHOLESTEROL/HDL RATIO: 3.2
CO2 SERPL-SCNC: 30 MMOL/L (ref 21–32)
CREAT SERPL-MCNC: 0.91 MG/DL (ref 0.5–1.05)
EGFRCR SERPLBLD CKD-EPI 2021: 73 ML/MIN/1.73M*2
EOSINOPHIL # BLD AUTO: 0.12 X10*3/UL (ref 0–0.7)
EOSINOPHIL NFR BLD AUTO: 2.2 %
ERYTHROCYTE [DISTWIDTH] IN BLOOD BY AUTOMATED COUNT: 11.9 % (ref 11.5–14.5)
GLUCOSE SERPL-MCNC: 93 MG/DL (ref 74–99)
HCT VFR BLD AUTO: 41.2 % (ref 36–46)
HDLC SERPL-MCNC: 74.3 MG/DL
HGB BLD-MCNC: 13.6 G/DL (ref 12–16)
IMM GRANULOCYTES # BLD AUTO: 0.01 X10*3/UL (ref 0–0.7)
IMM GRANULOCYTES NFR BLD AUTO: 0.2 % (ref 0–0.9)
LDLC SERPL CALC-MCNC: 140 MG/DL
LYMPHOCYTES # BLD AUTO: 1.13 X10*3/UL (ref 1.2–4.8)
LYMPHOCYTES NFR BLD AUTO: 20.5 %
MCH RBC QN AUTO: 30.4 PG (ref 26–34)
MCHC RBC AUTO-ENTMCNC: 33 G/DL (ref 32–36)
MCV RBC AUTO: 92 FL (ref 80–100)
MONOCYTES # BLD AUTO: 0.51 X10*3/UL (ref 0.1–1)
MONOCYTES NFR BLD AUTO: 9.3 %
NEUTROPHILS # BLD AUTO: 3.7 X10*3/UL (ref 1.2–7.7)
NEUTROPHILS NFR BLD AUTO: 67.1 %
NON HDL CHOLESTEROL: 166 MG/DL (ref 0–149)
NRBC BLD-RTO: 0 /100 WBCS (ref 0–0)
PLATELET # BLD AUTO: 198 X10*3/UL (ref 150–450)
POTASSIUM SERPL-SCNC: 4.2 MMOL/L (ref 3.5–5.3)
PROT SERPL-MCNC: 6.6 G/DL (ref 6.4–8.2)
RBC # BLD AUTO: 4.48 X10*6/UL (ref 4–5.2)
SODIUM SERPL-SCNC: 143 MMOL/L (ref 136–145)
T4 FREE SERPL-MCNC: 0.53 NG/DL (ref 0.61–1.12)
TRIGL SERPL-MCNC: 128 MG/DL (ref 0–149)
TSH SERPL-ACNC: 3.11 MIU/L (ref 0.44–3.98)
VLDL: 26 MG/DL (ref 0–40)
WBC # BLD AUTO: 5.5 X10*3/UL (ref 4.4–11.3)

## 2024-09-16 PROCEDURE — 84481 FREE ASSAY (FT-3): CPT

## 2024-09-16 PROCEDURE — 80053 COMPREHEN METABOLIC PANEL: CPT

## 2024-09-16 PROCEDURE — 80061 LIPID PANEL: CPT

## 2024-09-16 PROCEDURE — 84443 ASSAY THYROID STIM HORMONE: CPT

## 2024-09-16 PROCEDURE — 82306 VITAMIN D 25 HYDROXY: CPT

## 2024-09-16 PROCEDURE — 75571 CT HRT W/O DYE W/CA TEST: CPT

## 2024-09-16 PROCEDURE — 84439 ASSAY OF FREE THYROXINE: CPT

## 2024-09-16 PROCEDURE — 85025 COMPLETE CBC W/AUTO DIFF WBC: CPT

## 2024-09-16 PROCEDURE — 36415 COLL VENOUS BLD VENIPUNCTURE: CPT

## 2024-09-17 LAB
25(OH)D3 SERPL-MCNC: 46 NG/ML (ref 30–100)
T3FREE SERPL-MCNC: 2.5 PG/ML (ref 2.3–4.2)

## 2024-10-05 DIAGNOSIS — E06.3 HASHIMOTO'S DISEASE: ICD-10-CM

## 2024-10-05 DIAGNOSIS — E03.8 OTHER SPECIFIED HYPOTHYROIDISM: ICD-10-CM

## 2024-10-05 RX ORDER — THYROID 30 MG/1
45 TABLET ORAL
Qty: 135 TABLET | Refills: 3 | Status: SHIPPED | OUTPATIENT
Start: 2024-10-05 | End: 2025-10-05

## 2024-10-07 DIAGNOSIS — E03.8 OTHER SPECIFIED HYPOTHYROIDISM: ICD-10-CM

## 2024-10-07 DIAGNOSIS — E06.3 HASHIMOTO'S DISEASE: ICD-10-CM

## 2024-10-07 DIAGNOSIS — I10 PRIMARY HYPERTENSION: ICD-10-CM

## 2024-10-07 RX ORDER — THYROID 30 MG/1
45 TABLET ORAL
Qty: 135 TABLET | Refills: 3 | Status: SHIPPED | OUTPATIENT
Start: 2024-10-07 | End: 2025-10-07

## 2024-10-07 RX ORDER — LOSARTAN POTASSIUM 25 MG/1
25 TABLET ORAL DAILY
Qty: 90 TABLET | Refills: 3 | Status: SHIPPED | OUTPATIENT
Start: 2024-10-07 | End: 2025-10-07

## 2025-02-03 ENCOUNTER — APPOINTMENT (OUTPATIENT)
Dept: PRIMARY CARE | Facility: CLINIC | Age: 60
End: 2025-02-03
Payer: COMMERCIAL

## 2025-04-01 ENCOUNTER — APPOINTMENT (OUTPATIENT)
Dept: PRIMARY CARE | Facility: CLINIC | Age: 60
End: 2025-04-01
Payer: COMMERCIAL

## 2025-04-01 VITALS
BODY MASS INDEX: 26.57 KG/M2 | WEIGHT: 150 LBS | OXYGEN SATURATION: 98 % | DIASTOLIC BLOOD PRESSURE: 84 MMHG | SYSTOLIC BLOOD PRESSURE: 122 MMHG | HEART RATE: 80 BPM

## 2025-04-01 DIAGNOSIS — E06.3 HASHIMOTO'S DISEASE: ICD-10-CM

## 2025-04-01 DIAGNOSIS — E55.9 AVITAMINOSIS D: ICD-10-CM

## 2025-04-01 DIAGNOSIS — Z00.00 ROUTINE GENERAL MEDICAL EXAMINATION AT A HEALTH CARE FACILITY: ICD-10-CM

## 2025-04-01 PROCEDURE — 99214 OFFICE O/P EST MOD 30 MIN: CPT | Performed by: FAMILY MEDICINE

## 2025-04-01 PROCEDURE — 1036F TOBACCO NON-USER: CPT | Performed by: FAMILY MEDICINE

## 2025-04-01 ASSESSMENT — PATIENT HEALTH QUESTIONNAIRE - PHQ9
1. LITTLE INTEREST OR PLEASURE IN DOING THINGS: NOT AT ALL
2. FEELING DOWN, DEPRESSED OR HOPELESS: NOT AT ALL
SUM OF ALL RESPONSES TO PHQ9 QUESTIONS 1 AND 2: 0

## 2025-04-01 NOTE — PROGRESS NOTES
Subjective   Kayla Vanegas is a 59 y.o. female who presents for Follow-up (6 month follow up).   Last seen in Aug 2024  1st chelsey in Aug 2024     HPI  Allergic to penicillins   is a      #) Elevated HR - still fluctuating, has been about the same, maybe slightly less frequent   - some over 200  - a few times per month  - triggered with physical activity  - no associated symptoms  - BP is good now at 124/74   - was on HRT but stopped it last year  - COVID in Aug 2022  - up to 150+ per her smart watch     #) IBS, constipation - doing better   - tubulovillous polyp, had to get removed by  colorectal surgery  - last colonoscopy was 7/26/24 - with 1 year follow up      #) Night time waking 1-2 times  - unsure if snores, never told she snores  - tired in the afternoons  - never had a sleep study      #) Dyspnea with occasional heart palpitations- about the same  -= CT of the chest looks stable   - h/o seasonal allergies, GERD and Sarcoidosis on CT of the lungs in the past.   - will get PFTS and trial of albuterol   - with some elevated BP and heartburn  - EKG done last visit   - started a couple of months ago  - winded after taking a shower and changing laundry  - CAD on both sides  - no h/o clots personal or family,   - left leg cramps are chronic, no calf swelling or pain   - no cough  - chest heaviness   - gained 10# in 6 months with COVID  - 2013 EKG was NSR   - no headaches.   - not currently on treatment for GERD or Anxiety.      #) Heart disease runs in family   27 brother had MI   will get CT coronary calcium test and stress      #) stress with anxiety- stable   - poor sleep   - aging mother now in a nursing home   - is receptive to counseling but doesn’t have the time  - pt has a stressful job working for [employer]   - denies need for medications and/or counseling      #) low Vit D   - 31 on 5/16/19  - taking OTC      #) Elevated BUN, recheck in 2 months  - wnl on 11/20/19     #) elevated TSH,  "recheck in 2 months - still under producing thyroid, will rec starting 50 mcg daily      #) menopause- reports doing great on the Vitex , recently stopped it   - reports some dysfunctional sleep  - weight is stable   - mood is slightly better     #) left ear tinnitus - stable - RESOLVED and then came back - better with abx at  3 weeks ago   - \"wooshing sound\"  No ear pain or drainage.   no associated dizziness or palpitations  no vision changes   never had a formal hearing assessment.      #) seasonal and environmental allergies- stable   - on flonase prn  - occasional claritin     #) H/o kidney stones - better   - plans to see nephro tomorrow   - no pain   - no hematuria or dysuria   - no incontinence      #) GERD - stable , PPI didn’t seen to help much   - no on anything for this, and eats spicy foods   - occasional  - associated with spicy foods  - not taking anything for it   - never had an EGD     #) H/o ITP- stable   - 23 yrs ago after sons birth  - treated with steroids and IVIG      #) dx of sarcoidosis years ago  - granuloma found on ct scan  - no unexplained weight loss or shortness of breath     #) Preventive:  Smoker: never   Etoh: social/occasional   BMI: 22.7  Fam h/o: breast CA, T2DM and CAD  Vaccinations: never gets flu shots,   PAP: 2017  Mammogram: 3/2017; q 6 month mammograms for left breast lesion (through [facility])   Colonoscopy: needs, deferring until later   DEXA: n/a  Fasting blood work: 5/16/19  Last eye exam: UTD   Last dental Exam: needs  Willi H/o appy, C section and tubal ligation     ROS was completed and all systems are negative with the exception of what was noted in the the HPI.     Objective     /84   Pulse 80   Wt 68 kg (150 lb)   SpO2 98%   BMI 26.57 kg/m²      Last Labs:     CMP:   Lab Results   Component Value Date    CALCIUM 9.4 09/16/2024    CALCIUM 9.0 03/23/2023    CALCIUM 9.0 11/02/2021    PHOS 3.0 12/29/2020    PHOS 3.3 10/01/2020    PROT 6.6 09/16/2024    " "PROT 6.8 03/23/2023    PROT 6.8 11/02/2021    ALBUMIN 4.2 09/16/2024    ALBUMIN 4.4 03/23/2023    ALBUMIN 4.2 11/02/2021    ALBUMIN 4.4 12/29/2020    AST 26 09/16/2024    AST 32 03/23/2023    AST 19 11/02/2021    ALKPHOS 74 09/16/2024    ALKPHOS 77 03/23/2023    ALKPHOS 52 11/02/2021    BILITOT 0.5 09/16/2024    BILITOT 0.8 03/23/2023    BILITOT 0.7 11/02/2021     CBC:   Lab Results   Component Value Date    WBC 5.5 09/16/2024    WBC 6.8 03/23/2023    WBC 6.1 11/02/2021    HGB 13.6 09/16/2024    HGB 15.2 03/23/2023    HGB 14.6 11/02/2021    HCT 41.2 09/16/2024    HCT 44.6 03/23/2023    HCT 45.0 11/02/2021    MCV 92 09/16/2024    MCV 93 03/23/2023    MCV 96 11/02/2021     09/16/2024     03/23/2023     11/02/2021     A1C:   No results found for: \"HGBA1C\"  LIPID PANEL:   Lab Results   Component Value Date    CHOL 240 (H) 09/16/2024    CHOL 234 (H) 03/23/2023    CHOL 243 (H) 11/02/2021    TRIG 128 09/16/2024    TRIG 99 03/23/2023    TRIG 140 11/02/2021    HDL 74.3 09/16/2024    HDL 89.3 03/23/2023    HDL 72.6 11/02/2021    CHHDL 3.2 09/16/2024    CHHDL 2.6 03/23/2023    CHHDL 3.3 11/02/2021    LDLF 125 (H) 03/23/2023    LDLF 142 (H) 11/02/2021    VLDL 26 09/16/2024    VLDL 20 03/23/2023    VLDL 28 11/02/2021    NHDL 166 (H) 09/16/2024     TSH:   Lab Results   Component Value Date    TSH 3.11 09/16/2024    TSH 2.05 01/26/2024     PSA:   No results found for: \"PSA\"    Physical Exam  GEN: A+O, no acute distress  HEENT: NC/AT, Oropharynx clear, no exudates, TM visualized, Extraoccular muscles intact, no facial droop; no thyromegaly or cervical LAD  RESP: CTAB, no wheezes   CV: RRR, no murmurs  ABD: soft, non-tender, + BS  SKIN: no rashes or bruising, no peripheral edema   NEURO: CN II-XII grossly intact, moves all extremities equally, no tremor   PSYCH: normal affect, appropriate mood     Assessment/Plan   Problem List Items Addressed This Visit    None  Continue to monitor the heart palpitations. " "    Continue the [place] thyroid 45 mg AM on an empty stomach   Recheck the thyroid levels now.     Continue follow up with Dr Whitney for the colonic polyp     For the recurrence of the constipation   Restart the Linzess after the home colon prep:   1 whole bottle of Miralax 238 grams mixed in 2 32oz bottles of gatorade, with 10 mg of Ducolax     Please schedule a hearing test for the \"wooshing\" in the ear.   We should consider a home sleep study for the breathing, palpitations and insomnia.     Please follow up with GYN to restart the Hormone replacement therapy     Your blood pressure is looking so much better and is in range at 122/84.     Continue the flonase daily, allegra and muscinex     Follow up with Dr Manriquez for women yearly as recommend,     Please get the mammogram it is due now     Repeat all your labs in September 2025. Orders are in     Follow up in 6 months for follow up          Haylee Steiner DO, MSMed, ABOM  [address]   [address]   [place] [address]  Ph. [phone]  Fx. [phone]  "

## 2025-04-01 NOTE — PATIENT INSTRUCTIONS
"Continue to monitor the heart palpitations.     Continue the [place] thyroid 45 mg AM on an empty stomach   Recheck the thyroid levels now.     Continue follow up with Dr Whitney for the colonic polyp     For the recurrence of the constipation   Restart the Linzess after the home colon prep:   1 whole bottle of Miralax 238 grams mixed in 2 32oz bottles of gatorade, with 10 mg of Ducolax     Please schedule a hearing test for the \"wooshing\" in the ear.   We should consider a home sleep study for the breathing, palpitations and insomnia.     Please follow up with GYN to restart the Hormone replacement therapy     Your blood pressure is looking so much better and is in range at 122/84.     Continue the flonase daily, allegra and muscinex     Follow up with Dr Manriquez for women yearly as recommend,     Please get the mammogram it is due now     Repeat all your labs in September 2025. Orders are in     Follow up in 6 months for follow up   "

## 2025-04-08 DIAGNOSIS — K59.04 CHRONIC IDIOPATHIC CONSTIPATION: Primary | ICD-10-CM

## 2025-04-09 PROCEDURE — 87624 HPV HI-RISK TYP POOLED RSLT: CPT

## 2025-04-09 PROCEDURE — 88175 CYTOPATH C/V AUTO FLUID REDO: CPT

## 2025-04-11 ENCOUNTER — LAB REQUISITION (OUTPATIENT)
Dept: LAB | Facility: HOSPITAL | Age: 60
End: 2025-04-11
Payer: COMMERCIAL

## 2025-04-11 ENCOUNTER — TELEMEDICINE (OUTPATIENT)
Dept: PHARMACY | Facility: HOSPITAL | Age: 60
End: 2025-04-11
Payer: COMMERCIAL

## 2025-04-11 DIAGNOSIS — K59.00 CONSTIPATION, UNSPECIFIED CONSTIPATION TYPE: ICD-10-CM

## 2025-04-11 DIAGNOSIS — Z11.51 ENCOUNTER FOR SCREENING FOR HUMAN PAPILLOMAVIRUS (HPV): ICD-10-CM

## 2025-04-11 DIAGNOSIS — K59.04 CHRONIC IDIOPATHIC CONSTIPATION: ICD-10-CM

## 2025-04-11 DIAGNOSIS — Z01.419 ENCOUNTER FOR GYNECOLOGICAL EXAMINATION (GENERAL) (ROUTINE) WITHOUT ABNORMAL FINDINGS: ICD-10-CM

## 2025-04-11 DIAGNOSIS — Z12.4 ENCOUNTER FOR SCREENING FOR MALIGNANT NEOPLASM OF CERVIX: ICD-10-CM

## 2025-04-11 NOTE — PROGRESS NOTES
Clinical Pharmacy Appointment    Patient ID: Kayla Vanegas is a 59 y.o. female who presents for Constipation.    Pt is here for First appointment.     Referring Provider: Haylee Steiner DO  PCP: Haylee Steiner DO   Last visit with PCP: 4/1/25   Next visit with PCP: 10/2/25    Subjective     Interval History  Referred for Linzess initiation/coverage  Previously on Linzess from GI a few years ago when initially diagnosed with IBS-C  Improved symptoms so stopped taking, now uncontrolled  Reports using miralax as needed and using old supply of Linzess as needed - wants to re-establish on therapy   Denies stimulant laxative use    IRRITABLE BOWEL SYNDROME WITH CONSTIPATION  Does patient see a gastroenterologist: No  Last scope 7/2024    Symptoms  Patient has been having occasional firm stools every 1 -2 days.  Defecation has been incomplete.  occasional straining , persistent bloating and gas   Symptoms have gradually worsened.  Co-Morbid conditions:irritable bowel syndrome.    Medication Therapy  Current over the counter: osmotic miralax  which has been not very effective.  Current prescription:  None    Adverse effects: n/a    Past medications include:  Metamucil (intolerable bloating)    Medication Reconciliation:  Changed: None    Drug Interactions  No relevant drug interactions were noted.    Medication System Management  Patient's preferred pharmacy: [facility]   Adherence/Organization: No barriers  Affordability/Accessibility: No barriers     Objective   Allergies   Allergen Reactions    Penicillins Swelling     throat swelling as child     Social History     Social History Narrative    Not on file      Medication Review  Current Outpatient Medications   Medication Instructions    estradiol-norethindrone acet (ActivElla) 1-0.5 mg tablet 1 tablet, Daily    famotidine (PEPCID) 20 mg, Daily    fexofenadine (ALLEGRA) 180 mg, Daily RT    fluticasone (Flonase) 50 mcg/actuation nasal spray 2 sprays, Daily RT     linaCLOtide (LINZESS) 290 mcg, oral, Daily before breakfast    losartan (COZAAR) 25 mg, oral, Daily    meclizine (Antivert) 25 mg tablet take 1 tablet every 8 hours if needed for dizziness    thyroid (pork) (ARMOUR THYROID) 45 mg, oral, Daily before breakfast      Vitals  BP Readings from Last 2 Encounters:   04/01/25 122/84   08/23/24 124/76     BMI Readings from Last 1 Encounters:   04/01/25 26.57 kg/m²      Labs  Lab Results   Component Value Date    CALCIUM 9.4 09/16/2024     09/16/2024    K 4.2 09/16/2024    CO2 30 09/16/2024     (H) 09/16/2024    BUN 22 09/16/2024    CREATININE 0.91 09/16/2024    EGFR 73 09/16/2024     Lab Results   Component Value Date    ALT 37 09/16/2024    AST 26 09/16/2024    ALKPHOS 74 09/16/2024    BILITOT 0.5 09/16/2024     Lab Results   Component Value Date    TRIG 128 09/16/2024    CHOL 240 (H) 09/16/2024    LDLF 125 (H) 03/23/2023    LDLCALC 140 (H) 09/16/2024    HDL 74.3 09/16/2024     Lab Results   Component Value Date    MICROALBCREA 9.9 10/01/2020     Wt Readings from Last 3 Encounters:   04/01/25 68 kg (150 lb)   08/23/24 68 kg (150 lb)   12/05/23 66.2 kg (146 lb)      There is no height or weight on file to calculate BMI.     Assessment/Plan   Problem List Items Addressed This Visit    None  Visit Diagnoses       Chronic idiopathic constipation        Constipation, unspecified constipation type        Relevant Medications    linaCLOtide (Linzess) 290 mcg capsule          START Linzess 290mcg once daily.  Advised that as needed use of miralax and linzess is ineffective, importance to take every day for benefit.  No prior authorization required, insurance prefers 90 day for cost savings.     LINZESS is a once-daily pill that treats chronic idiopathic constipation (CIC) and multiple symptoms of IBS-C. It helps you have more frequent and complete bowel movements and helps relieve constipation and overall abdominal symptoms (belly pain, discomfort, and bloating)  associated with IBS-C.    For LINZESS to work best, take it every day. This is different from over-the-counter laxatives, which are often taken as needed.    When LINZESS is taken daily, constipation relief is typically felt in about 1 week. IBS-C patients may begin to experience relief of belly pain and overall abdominal symptoms (pain, discomfort, and bloating) in about 1 week, with symptoms typically improving over 12 weeks.    Take LINZESS at least 30 minutes before your first meal at about the same time of day.  If you have trouble swallowing capsules, open the LINZESS capsule and sprinkle all of the beads onto applesauce or into 1 oz. (30mL) of water.    If you miss a dose, take your dose as usual the following day. Do not take 2 doses at the same time to make up for a missed dose.    Diarrhea is a common side effect and often begins within the first 2 weeks of LINZESS treatment. This normally improves with continued daily use.   Go to the nearest emergency room right away if you develop unusual or severe abdominal pain, severe diarrhea, severe dehydration, or if you have bright red, bloody stools or black stools that look like tar.     Clinical Pharmacist follow-up: as needed, Telehealth visit    Continue all meds under the continuation of care with the referring provider and clinical pharmacy team.    Thank you,  Chana Mendez, PharmD  Clinical Pharmacy Specialist Primary Care  [phone]     Verbal consent to manage patient's drug therapy was obtained from the patient. They were informed they may decline to participate or withdraw from participation in pharmacy services at any time.

## 2025-04-22 LAB
CYTOLOGY CMNT CVX/VAG CYTO-IMP: NORMAL
HPV HR 12 DNA GENITAL QL NAA+PROBE: NEGATIVE
HPV HR GENOTYPES PNL CVX NAA+PROBE: NEGATIVE
HPV16 DNA SPEC QL NAA+PROBE: NEGATIVE
HPV18 DNA SPEC QL NAA+PROBE: NEGATIVE
LAB AP HPV GENOTYPE QUESTION: YES
LAB AP HPV HR: NORMAL
LABORATORY COMMENT REPORT: NORMAL
PATH REPORT.TOTAL CANCER: NORMAL

## 2025-10-02 ENCOUNTER — APPOINTMENT (OUTPATIENT)
Dept: PRIMARY CARE | Facility: CLINIC | Age: 60
End: 2025-10-02
Payer: COMMERCIAL